# Patient Record
Sex: FEMALE | Race: OTHER | HISPANIC OR LATINO | Employment: UNEMPLOYED | RURAL
[De-identification: names, ages, dates, MRNs, and addresses within clinical notes are randomized per-mention and may not be internally consistent; named-entity substitution may affect disease eponyms.]

---

## 2024-08-24 ENCOUNTER — HOSPITAL ENCOUNTER (EMERGENCY)
Facility: HOSPITAL | Age: 34
Discharge: HOME | End: 2024-08-24

## 2024-08-24 VITALS
TEMPERATURE: 98 F | BODY MASS INDEX: 25.64 KG/M2 | OXYGEN SATURATION: 100 % | RESPIRATION RATE: 16 BRPM | HEART RATE: 72 BPM | HEIGHT: 55 IN | DIASTOLIC BLOOD PRESSURE: 86 MMHG | SYSTOLIC BLOOD PRESSURE: 132 MMHG | WEIGHT: 110.81 LBS

## 2024-08-24 DIAGNOSIS — O20.0 THREATENED MISCARRIAGE: ICD-10-CM

## 2024-08-24 DIAGNOSIS — N93.9 VAGINAL BLEEDING: Primary | ICD-10-CM

## 2024-08-24 DIAGNOSIS — R10.30 LOWER ABDOMINAL PAIN: ICD-10-CM

## 2024-08-24 LAB
ANION GAP SERPL CALCULATED.3IONS-SCNC: 12 MMOL/L (ref 7–16)
BACTERIA #/AREA URNS HPF: ABNORMAL /HPF
BASOPHILS # BLD AUTO: 0.01 K/UL (ref 0–0.2)
BASOPHILS NFR BLD AUTO: 0.1 % (ref 0–1)
BILIRUB UR QL STRIP: NEGATIVE
BUN SERPL-MCNC: 6 MG/DL (ref 7–18)
BUN/CREAT SERPL: 10 (ref 6–20)
CALCIUM SERPL-MCNC: 9.1 MG/DL (ref 8.5–10.1)
CHLORIDE SERPL-SCNC: 101 MMOL/L (ref 98–107)
CLARITY UR: CLEAR
CO2 SERPL-SCNC: 26 MMOL/L (ref 21–32)
COLOR UR: YELLOW
CREAT SERPL-MCNC: 0.61 MG/DL (ref 0.55–1.02)
DIFFERENTIAL METHOD BLD: ABNORMAL
EGFR (NO RACE VARIABLE) (RUSH/TITUS): 122 ML/MIN/1.73M2
EOSINOPHIL # BLD AUTO: 0.11 K/UL (ref 0–0.5)
EOSINOPHIL NFR BLD AUTO: 1.3 % (ref 1–4)
ERYTHROCYTE [DISTWIDTH] IN BLOOD BY AUTOMATED COUNT: 11.9 % (ref 11.5–14.5)
GLUCOSE SERPL-MCNC: 95 MG/DL (ref 74–106)
GLUCOSE UR STRIP-MCNC: NEGATIVE MG/DL
HCG UR QL IA.RAPID: POSITIVE
HCT VFR BLD AUTO: 41.3 % (ref 38–47)
HGB BLD-MCNC: 13.8 G/DL (ref 12–16)
KETONES UR STRIP-SCNC: NEGATIVE MG/DL
LEUKOCYTE ESTERASE UR QL STRIP: NEGATIVE
LYMPHOCYTES # BLD AUTO: 1.47 K/UL (ref 1–4.8)
LYMPHOCYTES NFR BLD AUTO: 17.8 % (ref 27–41)
MCH RBC QN AUTO: 32 PG (ref 27–31)
MCHC RBC AUTO-ENTMCNC: 33.4 G/DL (ref 32–36)
MCV RBC AUTO: 95.8 FL (ref 80–96)
MONOCYTES # BLD AUTO: 0.7 K/UL (ref 0–0.8)
MONOCYTES NFR BLD AUTO: 8.5 % (ref 2–6)
MPC BLD CALC-MCNC: 11.6 FL (ref 9.4–12.4)
NEUTROPHILS # BLD AUTO: 5.99 K/UL (ref 1.8–7.7)
NEUTROPHILS NFR BLD AUTO: 72.3 % (ref 53–65)
NITRITE UR QL STRIP: NEGATIVE
PH UR STRIP: 6 PH UNITS
PLATELET # BLD AUTO: 254 K/UL (ref 150–400)
POTASSIUM SERPL-SCNC: 3.6 MMOL/L (ref 3.5–5.1)
PROT UR QL STRIP: NEGATIVE
RBC # BLD AUTO: 4.31 M/UL (ref 4.2–5.4)
RBC # UR STRIP: ABNORMAL /UL
RBC #/AREA URNS HPF: ABNORMAL /HPF
SODIUM SERPL-SCNC: 135 MMOL/L (ref 136–145)
SP GR UR STRIP: <=1.005
SQUAMOUS #/AREA URNS LPF: ABNORMAL /LPF
UROBILINOGEN UR STRIP-ACNC: 0.2 MG/DL
WBC # BLD AUTO: 8.28 K/UL (ref 4.5–11)
WBC #/AREA URNS HPF: ABNORMAL /HPF
YEAST #/AREA URNS HPF: ABNORMAL /HPF

## 2024-08-24 PROCEDURE — 99285 EMERGENCY DEPT VISIT HI MDM: CPT | Mod: ,,, | Performed by: NURSE PRACTITIONER

## 2024-08-24 PROCEDURE — 85025 COMPLETE CBC W/AUTO DIFF WBC: CPT | Performed by: NURSE PRACTITIONER

## 2024-08-24 PROCEDURE — 81025 URINE PREGNANCY TEST: CPT | Performed by: NURSE PRACTITIONER

## 2024-08-24 PROCEDURE — 81003 URINALYSIS AUTO W/O SCOPE: CPT | Performed by: NURSE PRACTITIONER

## 2024-08-24 PROCEDURE — 99285 EMERGENCY DEPT VISIT HI MDM: CPT

## 2024-08-24 PROCEDURE — 80048 BASIC METABOLIC PNL TOTAL CA: CPT | Performed by: NURSE PRACTITIONER

## 2024-08-24 PROCEDURE — 25000003 PHARM REV CODE 250: Performed by: NURSE PRACTITIONER

## 2024-08-24 RX ORDER — ACETAMINOPHEN 500 MG
1000 TABLET ORAL
Status: COMPLETED | OUTPATIENT
Start: 2024-08-24 | End: 2024-08-24

## 2024-08-24 RX ADMIN — ACETAMINOPHEN 1000 MG: 500 TABLET ORAL at 02:08

## 2024-08-24 NOTE — DISCHARGE INSTRUCTIONS
Go to [facility].  Dr. Mckeon accepts to come for Ultrasound.      Vaya a [facility].  El Dr. Mckeon acepta venir a hacerse rose ecografía

## 2024-08-24 NOTE — ED TRIAGE NOTES
Presents to ed per pov with  c/o vaginal bleeding and lower abd pain x 30 minutes.  Stated she has taken 3 home preg test that were positive but has not seen ob/gyn.stated she was setting and felt something coming from vagina and went to bathroom and noticed a lot of blood in toilet.

## 2024-08-24 NOTE — ED PROVIDER NOTES
"Encounter Date: 2024       History     Chief Complaint   Patient presents with    Abdominal Pain    Vaginal Bleeding     30 minutes     34 yr old  female to ED with c/o lower abd pain and vaginal bleeding.  [id number].  Reports had 3 positive pregnancy tests but has not been seen by OB yet.  Reports approx 30 min PTA felt like something was passing from vagina and noticed "a lot" of blood in the toilet and was having pain across the lower abd.      The history is provided by the patient.   Vaginal Bleeding  This is a new problem. The current episode started less than 1 hour ago. The problem has not changed since onset.Associated symptoms include abdominal pain. Nothing relieves the symptoms. She has tried nothing for the symptoms. The treatment provided no relief.     Review of patient's allergies indicates:  No Known Allergies  History reviewed. No pertinent past medical history.  History reviewed. No pertinent surgical history.  No family history on file.  Social History     Tobacco Use    Smoking status: Never    Smokeless tobacco: Never   Substance Use Topics    Alcohol use: Never    Drug use: Never     Review of Systems   Constitutional: Negative.    Respiratory: Negative.     Cardiovascular: Negative.    Gastrointestinal:  Positive for abdominal pain.   Genitourinary:  Positive for vaginal bleeding.   Musculoskeletal: Negative.    Skin: Negative.        Physical Exam     Initial Vitals [24 1332]   BP Pulse Resp Temp SpO2   138/65 81 16 98 °F (36.7 °C) 97 %      MAP       --         Physical Exam    Nursing note and vitals reviewed.  Constitutional: She appears well-developed and well-nourished.   Neck: Neck supple.   Cardiovascular:  Normal rate and regular rhythm.           Pulmonary/Chest: Breath sounds normal.   Abdominal: Abdomen is soft. There is no abdominal tenderness.   Musculoskeletal:         General: Normal range of motion.      Cervical back: Neck supple.     Neurological: She is " alert and oriented to person, place, and time.   Skin: Skin is warm and dry.         Medical Screening Exam   See Full Note    ED Course   Procedures  Labs Reviewed   URINALYSIS - Abnormal       Result Value    Color, UA Yellow      Clarity, UA Clear      pH, UA 6.0      Leukocytes, UA Negative      Nitrites, UA Negative      Protein, UA Negative      Glucose, UA Negative      Ketones, UA Negative      Urobilinogen, UA 0.2      Bilirubin, UA Negative      Blood, UA Moderate (*)     Specific Gravity, UA <=1.005     HCG QUALITATIVE URINE - Abnormal    HCG Qualitative, Urine Positive (*)    BASIC METABOLIC PANEL - Abnormal    Sodium 135 (*)     Potassium 3.6      Chloride 101      CO2 26      Anion Gap 12      Glucose 95      BUN 6 (*)     Creatinine 0.61      BUN/Creatinine Ratio 10      Calcium 9.1      eGFR 122     CBC WITH DIFFERENTIAL - Abnormal    WBC 8.28      RBC 4.31      Hemoglobin 13.8      Hematocrit 41.3      MCV 95.8      MCH 32.0 (*)     MCHC 33.4      RDW 11.9      Platelet Count 254      MPV 11.6      Neutrophils % 72.3 (*)     Lymphocytes % 17.8 (*)     Neutrophils, Abs 5.99      Lymphocytes, Absolute 1.47      Diff Type Auto      Monocytes % 8.5 (*)     Eosinophils % 1.3      Basophils % 0.1      Monocytes, Absolute 0.70      Eosinophils, Absolute 0.11      Basophils, Absolute 0.01     URINALYSIS, MICROSCOPIC - Abnormal    WBC, UA None Seen      RBC, UA 10-15 (*)     Bacteria, UA Few (*)     Yeast, UA None Seen      Squamous Epithelial Cells, UA Occasional (*)    CBC W/ AUTO DIFFERENTIAL    Narrative:     The following orders were created for panel order CBC Auto Differential.  Procedure                               Abnormality         Status                     ---------                               -----------         ------                     CBC with Differential[[id number]]       Abnormal            Final result                 Please view results for these tests on the individual orders.  "         Imaging Results    None          Medications   acetaminophen tablet 1,000 mg (1,000 mg Oral Given 24 1433)     Medical Decision Making  34 yr old  female to ED with c/o lower abd pain and vaginal bleeding.  [id number].  Reports had 3 positive pregnancy tests but has not been seen by OB yet.  Reports approx 30 min PTA felt like something was passing from vagina and noticed "a lot" of blood in the toilet and was having pain across the lower abd.        Amount and/or Complexity of Data Reviewed  Labs: ordered.     Details: No acute findings  Discussion of management or test interpretation with external provider(s): Pt has not had US yet, has lower abd pain and bleeding.  Discussed findings with patient and her  they request tx for ultrasound.  Dr. Mckeon accepts to [facility] for ultrasound.    Risk  OTC drugs.               ED Course as of 24 1521   Sat Aug 24, 2024   1452 Discussed findings with patient via interpretor.  Pt request tx for US.  Will speak with [facility] on call.  [CG]   1500 [place] with [facility] reports will call back  [CG]   1512 Dr. Mckeon [facility] OB on call, accepts to [facility] for Ultrasound. [CG]      ED Course User Index  [CG] Maryam Bailey FNP            .: Patient refused recommended mode of transport, explained increased risk. (reports  can drive.   agrees to go straight there.)              Clinical Impression:   Final diagnoses:  [N93.9] Vaginal bleeding (Primary)  [R10.30] Lower abdominal pain  [O20.0] Threatened miscarriage        ED Disposition Condition    Transfer to Another Facility Stable                Maryam Bailey FNP  24 1521    "